# Patient Record
Sex: MALE | Race: WHITE | Employment: STUDENT | URBAN - METROPOLITAN AREA
[De-identification: names, ages, dates, MRNs, and addresses within clinical notes are randomized per-mention and may not be internally consistent; named-entity substitution may affect disease eponyms.]

---

## 2018-01-30 ENCOUNTER — OFFICE VISIT (OUTPATIENT)
Dept: FAMILY MEDICINE CLINIC | Age: 23
End: 2018-01-30

## 2018-01-30 ENCOUNTER — HOSPITAL ENCOUNTER (OUTPATIENT)
Dept: LAB | Age: 23
Discharge: HOME | End: 2018-01-30
Payer: OTHER GOVERNMENT

## 2018-01-30 VITALS
HEART RATE: 62 BPM | SYSTOLIC BLOOD PRESSURE: 112 MMHG | TEMPERATURE: 98.1 F | RESPIRATION RATE: 24 BRPM | OXYGEN SATURATION: 98 % | DIASTOLIC BLOOD PRESSURE: 71 MMHG | WEIGHT: 159.2 LBS | BODY MASS INDEX: 29.3 KG/M2 | HEIGHT: 62 IN

## 2018-01-30 DIAGNOSIS — M25.50 ARTHRALGIA, UNSPECIFIED JOINT: Primary | ICD-10-CM

## 2018-01-30 DIAGNOSIS — M25.50 ARTHRALGIA, UNSPECIFIED JOINT: ICD-10-CM

## 2018-01-30 LAB — ERYTHROCYTE [SEDIMENTATION RATE] IN BLOOD: 1 MM/HR (ref 0–15)

## 2018-01-30 PROCEDURE — 85652 RBC SED RATE AUTOMATED: CPT | Performed by: NURSE PRACTITIONER

## 2018-01-30 PROCEDURE — 82652 VIT D 1 25-DIHYDROXY: CPT | Performed by: NURSE PRACTITIONER

## 2018-01-30 PROCEDURE — 86225 DNA ANTIBODY NATIVE: CPT | Performed by: NURSE PRACTITIONER

## 2018-01-30 PROCEDURE — 86618 LYME DISEASE ANTIBODY: CPT | Performed by: NURSE PRACTITIONER

## 2018-01-30 PROCEDURE — 86430 RHEUMATOID FACTOR TEST QUAL: CPT | Performed by: NURSE PRACTITIONER

## 2018-01-30 PROCEDURE — 36415 COLL VENOUS BLD VENIPUNCTURE: CPT | Performed by: NURSE PRACTITIONER

## 2018-01-30 RX ORDER — MELOXICAM 15 MG/1
15 TABLET ORAL DAILY
Qty: 30 TAB | Refills: 2 | Status: SHIPPED | OUTPATIENT
Start: 2018-01-30

## 2018-01-30 NOTE — MR AVS SNAPSHOT
[facility] 
 
 
 [address] [address] [address] [address] 
[phone] Patient: Alpa Sifuentes MRN: [id number] Visit Information Date & Time Provider Department Dept. Phone Encounter #  
 1/30/2018 11:00 AM Marito Kaufman, [address] [id number] Upcoming Health Maintenance Date Due Influenza Age 5 to Adult 8/1/2017 DTaP/Tdap/Td series (6 - Td) 9/21/2017 Allergies as of 1/30/2018  Review Complete On: 1/30/2018 By: Marito Kaufman NP No Known Allergies Current Immunizations  Never Reviewed Name Date DTAP Vaccine 6/15/1999, 1994, 1994, 1994, 1994 HIB Vaccine 1994, 1994, 1994 Hepatitis A Vaccine 10/20/2009, 9/21/2007 Hepatitis B Vaccine 10/25/1999, 5/13/1999, 3/15/1999 IPV 4/15/1999, 1994, 1994, 1994 Influenza Vaccine Nasal 10/16/2009 MMR Vaccine 3/15/1998, 8/18/1995 Meningococcal (MCV4P) Vaccine 2/15/2016 Meningococcal Vaccine 10/20/2009 PPD 5/4/1995 TDAP Vaccine 9/21/2007 Not reviewed this visit You Were Diagnosed With   
  
 Codes Comments Arthralgia, unspecified joint    -  Primary ICD-10-CM: M25.50 ICD-9-CM: 719.40 Vitals BP Pulse Temp Resp Height(growth percentile) Weight(growth percentile) 112/71 (BP 1 Location: Right arm, BP Patient Position: Sitting) 62 98.1 °F (36.7 °C) (Oral) 24 5' 2\" (1.575 m) 159 lb 3.2 oz (72.2 kg) SpO2 BMI Smoking Status 98% 29.12 kg/m2 Never Smoker BMI and BSA Data Body Mass Index Body Surface Area  
 29.12 kg/m 2 1.78 m 2 Preferred Pharmacy Pharmacy Name Phone Kristin Wallace [address], [facility] [phone] Your Updated Medication List  
  
   
This list is accurate as of: 1/30/18 11:55 AM.  Always use your most recent med list.  
  
  
  
  
 meloxicam 15 mg tablet Commonly known as:  MOBIC Take 1 Tab by mouth daily. Prescriptions Sent to Pharmacy Refills  
 meloxicam (MOBIC) 15 mg tablet 2 Sig: Take 1 Tab by mouth daily. Class: Normal  
 Pharmacy: Layla Leal [facility], [address] [place] [address] #: [phone] Route: Oral  
  
To-Do List   
 01/30/2018 Lab:  ARMOND COMPREHENSIVE PANEL   
  
 01/30/2018 Lab:  LYME AB TOTAL W/RFLX W BLOT   
  
 01/30/2018 Lab:  RHEUMATOID FACTOR, QL   
  
 01/30/2018 Lab:  SED RATE (ESR)   
  
 01/30/2018 Lab:  VITAMIN D, 1, 25 DIHYDROXY Introducing [facility]! Dear Igor Bella: Thank you for requesting a Payoff account. Our records indicate that you already have an active Payoff account. You can access your account anytime at [email or web address] Did you know that you can access your hospital and ER discharge instructions at any time in Payoff? You can also review all of your test results from your hospital stay or ER visit. Additional Information If you have questions, please visit the Frequently Asked Questions section of the Payoff website at [email or web address] Remember, Payoff is NOT to be used for urgent needs. For medical emergencies, dial 911. Now available from your iPhone and Android! Please provide this summary of care documentation to your next provider. Lyme Disease Testing Disclaimer:   
 § 36.9-3570.4. (Expires July 1, 2018) Lyme disease testing information disclosure. A. Every licensee or his in-office designee who orders a laboratory test for the presence of Lyme disease shall provide to the patient or his legal representative the following written information: \"ACCORDING TO THE CENTERS FOR DISEASE CONTROL AND PREVENTION, AS OF 2011 LYME DISEASE IS THE SIXTH FASTEST GROWING DISEASE IN THE [place].   
YOUR HEALTH CARE PROVIDER HAS ORDERED A LABORATORY TEST FOR THE PRESENCE OF LYME DISEASE FOR YOU. CURRENT LABORATORY TESTING FOR LYME DISEASE CAN BE PROBLEMATIC AND STANDARD LABORATORY TESTS OFTEN RESULT IN FALSE NEGATIVE AND FALSE POSITIVE RESULTS, AND IF DONE TOO EARLY, YOU MAY NOT HAVE PRODUCED ENOUGH ANTIBODIES TO BE CONSIDERED POSITIVE BECAUSE YOUR IMMUNE RESPONSE REQUIRES TIME TO DEVELOP ANTIBODIES. IF YOU ARE TESTED FOR LYME DISEASE, AND THE RESULTS ARE NEGATIVE, THIS DOES NOT NECESSARILY MEAN YOU DO NOT HAVE LYME DISEASE. IF YOU CONTINUE TO EXPERIENCE SYMPTOMS, YOU SHOULD CONTACT YOUR HEALTH CARE PROVIDER AND INQUIRE ABOUT THE APPROPRIATENESS OF RETESTING OR ADDITIONAL TREATMENT. \"  
B. Licensees shall be immune from civil liability for the provision of the written information required by this section absent gross negligence or willful misconduct. Your primary care clinician is listed as Shahriar Malik. If you have any questions after today's visit, please call [phone].

## 2018-01-30 NOTE — PROGRESS NOTES
HISTORY OF PRESENT ILLNESS  Noa Hu is a 20 y.o. male. Patient presents today with joint pain. Chief Complaint   Patient presents with    Shoulder Pain    Knee Pain     more right than left       HPI  Pt was a weight . He has taken Celebrex in the past 2016  Pt has had back problems for 4 years now. He took Celebrex with no relief and other anti-inflammatories. He has been a runner, then he lifted weights. His shoulders started bothering him so he switched over to swimming this past few months. He thought by now, it should not hurt when he swims. He went back to running just recently and when he \"steps out\" with his right knee there is sharp pains. He went on line and looked up exercises for his shoulders and arms, this has not helped either. Both of his parents have arthritis. He was at [facility] and will be moving to [place] for his new job in February. Review of Systems   Constitutional: Negative. Musculoskeletal: Positive for back pain (chronic lower back pain x4 years.) and joint pain (yen shoulders, right worse than left. Bilateral knees, right greater than left. ). Skin: Negative. Visit Vitals    /71 (BP 1 Location: Right arm, BP Patient Position: Sitting)    Pulse 62    Temp 98.1 °F (36.7 °C) (Oral)    Resp 24    Ht 5' 2\" (1.575 m)    Wt 159 lb 3.2 oz (72.2 kg)    SpO2 98%    BMI 29.12 kg/m2       Physical Exam   Constitutional: He appears well-developed. No distress. Neck: Normal range of motion. Cardiovascular: Normal rate, regular rhythm and normal heart sounds. No murmur heard. Pulmonary/Chest: Effort normal and breath sounds normal. No respiratory distress. He has no wheezes. He has no rales. Musculoskeletal: He exhibits tenderness. He exhibits no edema. Right shoulder: He exhibits tenderness, effusion and crepitus. He exhibits normal range of motion. Left shoulder: He exhibits tenderness and crepitus.  He exhibits normal range of motion, no swelling and no effusion. Right knee: He exhibits effusion. He exhibits normal range of motion, no swelling and no erythema. Tenderness found. Left knee: He exhibits effusion. He exhibits normal range of motion, no swelling and no erythema. Tenderness found. Skin: No rash noted. No erythema. ASSESSMENT and PLAN    ICD-10-CM ICD-9-CM    1. Arthralgia, unspecified joint M25.50 719.40 ARMOND COMPREHENSIVE PANEL      SED RATE (ESR)      RHEUMATOID FACTOR, QL      VITAMIN D, 1, 25 DIHYDROXY      LYME AB TOTAL W/RFLX W BLOT      meloxicam (MOBIC) 15 mg tablet     PLAN:  We discussed his symptoms. We will start with  Labs. Next step is MRI. Pt is moving to [place] mid February. Further work up will be limited due to this move. He will establish care there. I asked him to take Mobic once a day for the anti-inflammatory effects. Pt agreed with the above short term plan. Pt is to call with any concerns. Pt was given after visit summary.

## 2018-01-30 NOTE — PROGRESS NOTES
1. Have you been to the ER, urgent care clinic since your last visit? Hospitalized since your last visit? No    2. Have you seen or consulted any other health care providers outside of the [address] [place] [address] since your last visit? Include any pap smears or colon screening.  No

## 2018-01-31 LAB
1,25(OH)2D3 SERPL-MCNC: 54.6 PG/ML (ref 19.9–79.3)
B BURGDOR IGG+IGM SER-ACNC: <0.91 ISR (ref 0–0.9)
RHEUMATOID FACT SER QL LA: NEGATIVE

## 2018-02-01 LAB
CENTROMERE B AB SER-ACNC: <0.2 AI (ref 0–0.9)
CHROMATIN AB SERPL-ACNC: <0.2 AI (ref 0–0.9)
DSDNA AB SER-ACNC: <1 IU/ML (ref 0–9)
ENA JO1 AB SER-ACNC: <0.2 AI (ref 0–0.9)
ENA RNP AB SER-ACNC: <0.2 AI (ref 0–0.9)
ENA SCL70 AB SER-ACNC: <0.2 AI (ref 0–0.9)
ENA SM AB SER-ACNC: <0.2 AI (ref 0–0.9)
ENA SS-A AB SER-ACNC: <0.2 AI (ref 0–0.9)
ENA SS-B AB SER-ACNC: <0.2 AI (ref 0–0.9)
SEE BELOW, 164869: NORMAL